# Patient Record
Sex: MALE | Race: WHITE | NOT HISPANIC OR LATINO | Employment: PART TIME | URBAN - METROPOLITAN AREA
[De-identification: names, ages, dates, MRNs, and addresses within clinical notes are randomized per-mention and may not be internally consistent; named-entity substitution may affect disease eponyms.]

---

## 2023-10-26 PROBLEM — I47.29 NSVT (NONSUSTAINED VENTRICULAR TACHYCARDIA) (MULTI): Status: ACTIVE | Noted: 2023-10-26

## 2023-10-26 PROBLEM — N52.9 MALE ERECTILE DISORDER: Status: ACTIVE | Noted: 2023-10-26

## 2023-10-26 PROBLEM — R06.02 SHORTNESS OF BREATH: Status: ACTIVE | Noted: 2023-10-26

## 2023-10-26 PROBLEM — R00.0 TACHYCARDIA: Status: ACTIVE | Noted: 2023-10-26

## 2023-10-26 PROBLEM — E78.5 HYPERLIPIDEMIA: Status: ACTIVE | Noted: 2023-10-26

## 2023-10-26 PROBLEM — E83.52 HYPERCALCEMIA: Status: ACTIVE | Noted: 2023-10-26

## 2023-10-26 PROBLEM — R60.0 LEG EDEMA, LEFT: Status: ACTIVE | Noted: 2023-10-26

## 2023-10-26 PROBLEM — K64.8 BLEEDING INTERNAL HEMORRHOIDS: Status: ACTIVE | Noted: 2023-10-26

## 2023-10-26 PROBLEM — J45.909 ASTHMA (HHS-HCC): Status: ACTIVE | Noted: 2023-10-26

## 2023-10-26 PROBLEM — I49.9 ARRHYTHMIA: Status: ACTIVE | Noted: 2023-10-26

## 2023-10-26 PROBLEM — I49.8 VENTRICULAR TRIGEMINY: Status: ACTIVE | Noted: 2023-10-26

## 2023-10-26 PROBLEM — K58.9 IRRITABLE BOWEL SYNDROME: Status: ACTIVE | Noted: 2023-10-26

## 2023-10-26 PROBLEM — I10 HTN (HYPERTENSION), BENIGN: Status: ACTIVE | Noted: 2023-10-26

## 2023-10-26 PROBLEM — K21.9 GASTROESOPHAGEAL REFLUX DISEASE: Status: ACTIVE | Noted: 2023-10-26

## 2023-10-26 PROBLEM — M19.90 OSTEOARTHRITIS: Status: ACTIVE | Noted: 2023-10-26

## 2023-10-26 RX ORDER — TORSEMIDE 20 MG/1
TABLET ORAL
COMMUNITY
Start: 2023-02-28

## 2023-10-26 RX ORDER — ATORVASTATIN CALCIUM 40 MG/1
1 TABLET, FILM COATED ORAL DAILY
COMMUNITY
Start: 2014-04-15

## 2023-10-26 RX ORDER — ASPIRIN 81 MG/1
1 TABLET ORAL DAILY
COMMUNITY

## 2023-10-26 RX ORDER — METOPROLOL TARTRATE 50 MG/1
TABLET ORAL 2 TIMES DAILY
COMMUNITY
Start: 2018-11-16

## 2023-10-26 RX ORDER — LATANOPROST 50 UG/ML
SOLUTION/ DROPS OPHTHALMIC
COMMUNITY
Start: 2023-03-21

## 2023-10-26 RX ORDER — HYDROXYZINE PAMOATE 50 MG/1
CAPSULE ORAL
COMMUNITY
Start: 2022-12-28

## 2023-10-26 RX ORDER — FLUTICASONE PROPIONATE 50 MCG
SPRAY, SUSPENSION (ML) NASAL
COMMUNITY
Start: 2015-10-01

## 2023-10-26 RX ORDER — PANTOPRAZOLE SODIUM 40 MG/1
TABLET, DELAYED RELEASE ORAL 2 TIMES DAILY
COMMUNITY
Start: 2014-06-01

## 2023-10-26 RX ORDER — POTASSIUM CHLORIDE 1500 MG/1
1 TABLET, EXTENDED RELEASE ORAL DAILY
COMMUNITY

## 2023-10-26 RX ORDER — DORZOLAMIDE HYDROCHLORIDE AND TIMOLOL MALEATE 20; 5 MG/ML; MG/ML
1 SOLUTION/ DROPS OPHTHALMIC 2 TIMES DAILY
COMMUNITY
Start: 2023-08-17

## 2023-10-26 RX ORDER — LORATADINE 10 MG/1
1 TABLET ORAL DAILY PRN
COMMUNITY

## 2023-10-27 ENCOUNTER — OFFICE VISIT (OUTPATIENT)
Dept: CARDIOLOGY | Facility: CLINIC | Age: 68
End: 2023-10-27
Payer: MEDICARE

## 2023-10-27 VITALS
OXYGEN SATURATION: 92 % | HEIGHT: 69 IN | HEART RATE: 70 BPM | SYSTOLIC BLOOD PRESSURE: 144 MMHG | BODY MASS INDEX: 39.25 KG/M2 | WEIGHT: 265 LBS | DIASTOLIC BLOOD PRESSURE: 80 MMHG

## 2023-10-27 DIAGNOSIS — I10 HTN (HYPERTENSION), BENIGN: ICD-10-CM

## 2023-10-27 DIAGNOSIS — I47.29 NSVT (NONSUSTAINED VENTRICULAR TACHYCARDIA) (MULTI): Primary | ICD-10-CM

## 2023-10-27 DIAGNOSIS — E78.49 OTHER HYPERLIPIDEMIA: ICD-10-CM

## 2023-10-27 PROCEDURE — 99214 OFFICE O/P EST MOD 30 MIN: CPT | Performed by: INTERNAL MEDICINE

## 2023-10-27 PROCEDURE — 1159F MED LIST DOCD IN RCRD: CPT | Performed by: INTERNAL MEDICINE

## 2023-10-27 PROCEDURE — 3008F BODY MASS INDEX DOCD: CPT | Performed by: INTERNAL MEDICINE

## 2023-10-27 PROCEDURE — 3079F DIAST BP 80-89 MM HG: CPT | Performed by: INTERNAL MEDICINE

## 2023-10-27 PROCEDURE — 3077F SYST BP >= 140 MM HG: CPT | Performed by: INTERNAL MEDICINE

## 2023-10-27 RX ORDER — CYCLOBENZAPRINE HCL 10 MG
10 TABLET ORAL EVERY 8 HOURS PRN
COMMUNITY
Start: 2023-09-08

## 2023-10-27 RX ORDER — ALBUTEROL SULFATE 0.83 MG/ML
2.5 SOLUTION RESPIRATORY (INHALATION) EVERY 4 HOURS PRN
COMMUNITY
Start: 2022-12-15

## 2023-10-27 RX ORDER — ALBUTEROL SULFATE 90 UG/1
2 AEROSOL, METERED RESPIRATORY (INHALATION) EVERY 6 HOURS PRN
COMMUNITY
Start: 2022-12-04

## 2023-10-27 NOTE — LETTER
October 27, 2023       No Recipients    Patient: Jules Madrigal   YOB: 1955   Date of Visit: 10/27/2023       Dear Dr. Casey Recipients:    Thank you for referring Jules Madrigal to me for evaluation. Below are my notes for this consultation.  If you have questions, please do not hesitate to call me. I look forward to following your patient along with you.       Sincerely,     Goldy Wallace MD      CC:   No Recipients  ______________________________________________________________________________________        [facility] Cardiology Outpatient Follow-up Visit     Reason for Visit: NSVT    HPI: Jules Madrigal is a 68 y.o.  male who presents today for followup.     The patient is a 68-year-old male with a history of hypertension, hyperlipidemia, morbid obesity, osteoarthritis who initially presented with a syncopal episode in November 2018. He had been taking Adipex and Topamax to lose weight. He woke up feeling nauseous. Despite this he went to work. He felt lightheaded at work and eventually had a syncopal episode. Per report, there were some ventricular tachycardia although we do not have documentation of this. Patient was in trigeminy when he came in to the emergency department. His weight loss medications were stopped and he was placed on beta-blockade. He was discharged with the recorder. His ejection fraction was 50-55%. He states he feels good without chest discomfort, shortness of breath, palpitations, lightheadedness, syncope, orthopnea, PND. He has left greater than right lower extremity edema since a total knee replacement in September 2020.     Echocardiogram demonstrates an ejection fraction of 50-55%. Holter monitoring demonstrates 26,690 PVCs with runs of nonsustained ventricular tachycardia, longest episode being 12 beats in duration. This comprises 11.1% of his total monitored heartbeats. Repeat Holter monitoring demonstrated rare PVCs. MPI August 2020: Normal  perfusion, ejection fraction 61%. 12/8/2022 echo: Ejection fraction 55 to 60%, pseudo normal filling.     Past Medical History:   He has a past medical history of Allergy status to unspecified drugs, medicaments and biological substances, Cough, unspecified (12/19/2014), Hemorrhage of anus and rectum, Impacted cerumen, right ear, Other chest pain, Other enthesopathies, not elsewhere classified, Other specified soft tissue disorders, Pain in left knee, Pain in unspecified toe(s), Paresthesia of skin, Personal history of (healed) traumatic fracture, Personal history of other diseases of the digestive system, Personal history of other diseases of the digestive system, Personal history of other endocrine, nutritional and metabolic disease, Personal history of other medical treatment, Personal history of other medical treatment, Personal history of other specified conditions, Strain of unspecified achilles tendon, initial encounter, Syncope and collapse (12/07/2018), Ventral hernia without obstruction or gangrene, and Ventricular premature depolarization (12/11/2018).    Surgical History:   He has a past surgical history that includes Hernia repair (09/18/2014); Appendectomy (09/18/2014); and Other surgical history (02/18/2021).    Family History:   No family history on file.        Allergies:  Nsaids (non-steroidal anti-inflammatory drug), Penicillins, and Erythromycin     Social History:   Former smoker, social alcohol, no drugs.    Prior Cardiovascular Testing (Personally Reviewed):     Review of Systems:  Review of Systems   All other systems reviewed and are negative.      Outpatient Medications:    Current Outpatient Medications:   •  albuterol 2.5 mg /3 mL (0.083 %) nebulizer solution, Take 3 mL (2.5 mg) by nebulization every 4 hours if needed., Disp: , Rfl:   •  albuterol 90 mcg/actuation inhaler, Inhale 2 puffs every 6 hours if needed., Disp: , Rfl:   •  cyclobenzaprine (Flexeril) 10 mg tablet, Take 1 tablet (10  "mg) by mouth every 8 hours if needed., Disp: , Rfl:   •  aspirin 81 mg EC tablet, Take 1 tablet (81 mg) by mouth once daily., Disp: , Rfl:   •  atorvastatin (Lipitor) 40 mg tablet, Take 1 tablet (40 mg) by mouth once daily., Disp: , Rfl:   •  dorzolamide-timoloL (Cosopt) 22.3-6.8 mg/mL ophthalmic solution, Administer 1 drop into affected eye(s) twice a day., Disp: , Rfl:   •  fluticasone (Flonase) 50 mcg/actuation nasal spray, Administer into affected nostril(s)., Disp: , Rfl:   •  hydrocortisone acetate 2.5 % cream with perineal applicator, INSERT 1 APPLICATORFUL RECTALLY 3 TIMES DAILY., Disp: , Rfl:   •  hydrOXYzine pamoate (Vistaril) 50 mg capsule, , Disp: , Rfl:   •  Klor-Con M20 20 mEq ER tablet, Take 1 tablet (20 mEq) by mouth once daily., Disp: , Rfl:   •  latanoprost (Xalatan) 0.005 % ophthalmic solution, INSTILL ONE DROP INTO THE RIGHT EYE EVERY EVENING, Disp: , Rfl:   •  loratadine (Claritin) 10 mg tablet, Take 1 tablet (10 mg) by mouth once daily as needed., Disp: , Rfl:   •  metoprolol tartrate (Lopressor) 50 mg tablet, Take by mouth twice a day., Disp: , Rfl:   •  pantoprazole (ProtoNix) 40 mg EC tablet, Take by mouth twice a day., Disp: , Rfl:   •  torsemide (Demadex) 20 mg tablet, , Disp: , Rfl:      Last Recorded Vitals  /80 (BP Location: Left arm, Patient Position: Sitting, BP Cuff Size: Large adult)   Pulse 70   Ht 1.753 m (5' 9\")   Wt 120 kg (265 lb)   SpO2 92%   BMI 39.13 kg/m²     Physical Exam:    Physical Exam  Vitals reviewed.   Constitutional:       Appearance: Normal appearance.   HENT:      Head: Normocephalic and atraumatic.      Mouth/Throat:      Mouth: Mucous membranes are moist.      Pharynx: Oropharynx is clear.   Cardiovascular:      Rate and Rhythm: Normal rate and regular rhythm.      Pulses: Normal pulses.      Heart sounds: Normal heart sounds.   Pulmonary:      Effort: Pulmonary effort is normal.      Breath sounds: Normal breath sounds.   Abdominal:      General: " "Bowel sounds are normal.      Palpations: Abdomen is soft.   Musculoskeletal:      Cervical back: Neck supple.      Right lower le+ Edema present.      Left lower le+ Edema present.   Skin:     General: Skin is warm and dry.   Neurological:      General: No focal deficit present.      Mental Status: He is alert.   Psychiatric:         Mood and Affect: Mood normal.         Behavior: Behavior normal.         Lab/Radiology/Diagnostic Review:    Labs    Lab Results   Component Value Date    GLUCOSE 81 2023    CALCIUM 9.5 2023     2023    K 4.0 2023    CO2 32 2023     2023    BUN 16 2023    CREATININE 0.96 2023       Lab Results   Component Value Date    WBC 6.4 2022    HGB 14.2 2022    HCT 44.9 2022    MCV 97 2022     2022       No results found for: \"CHOL\"  No results found for: \"HDL\"  No results found for: \"LDLCALC\"  No results found for: \"TRIG\"  No components found for: \"CHOLHDL\"    Lab Results   Component Value Date    BNP 14 2022    BNP 26 2018       Lab Results   Component Value Date    TSH 2.63 2018       Assessment:   1.  NSVT.  2.  Hypertension  3.  Hyperlipidemia  4.  Lower extremity edema.    Overall Plan:  Patient is asymptomatic from a cardiac standpoint. He is on aspirin, atorvastatin and beta-blockade.     Patient's Holter monitor demonstrated significant ectopy (11.1% of his total heartbeats). This was off weight loss medications and on low-dose metoprolol. With increasing beta-blockade, a repeat Holter monitor showed rare, isolated PVCs with no ventricular tachycardia. His ejection fraction is low normal at 50-55%.      Would continue his current medical therapy. Myocardial perfusion study in 2020 was negative for ischemia with an ejection fraction 61%. His echocardiogram in 2022 demonstrated normal LV function     Continue torsemide.     The patient will follow-up " with me in 12 months or sooner if he has more problems.    Disposition-     Thank you for your visit today. Please contact our office with any questions.     Goldy Wallace MD

## 2023-10-27 NOTE — PROGRESS NOTES
[facility] Cardiology Outpatient Follow-up Visit     Reason for Visit: NSVT    HPI: Jules Madrigal is a 68 y.o.  male who presents today for followup.     The patient is a 68-year-old male with a history of hypertension, hyperlipidemia, morbid obesity, osteoarthritis who initially presented with a syncopal episode in November 2018. He had been taking Adipex and Topamax to lose weight. He woke up feeling nauseous. Despite this he went to work. He felt lightheaded at work and eventually had a syncopal episode. Per report, there were some ventricular tachycardia although we do not have documentation of this. Patient was in trigeminy when he came in to the emergency department. His weight loss medications were stopped and he was placed on beta-blockade. He was discharged with the recorder. His ejection fraction was 50-55%. He states he feels good without chest discomfort, shortness of breath, palpitations, lightheadedness, syncope, orthopnea, PND. He has left greater than right lower extremity edema since a total knee replacement in September 2020.     Echocardiogram demonstrates an ejection fraction of 50-55%. Holter monitoring demonstrates 26,690 PVCs with runs of nonsustained ventricular tachycardia, longest episode being 12 beats in duration. This comprises 11.1% of his total monitored heartbeats. Repeat Holter monitoring demonstrated rare PVCs. MPI August 2020: Normal perfusion, ejection fraction 61%. 12/8/2022 echo: Ejection fraction 55 to 60%, pseudo normal filling.     Past Medical History:   He has a past medical history of Allergy status to unspecified drugs, medicaments and biological substances, Cough, unspecified (12/19/2014), Hemorrhage of anus and rectum, Impacted cerumen, right ear, Other chest pain, Other enthesopathies, not elsewhere classified, Other specified soft tissue disorders, Pain in left knee, Pain in unspecified toe(s), Paresthesia of skin, Personal history of (healed) traumatic  fracture, Personal history of other diseases of the digestive system, Personal history of other diseases of the digestive system, Personal history of other endocrine, nutritional and metabolic disease, Personal history of other medical treatment, Personal history of other medical treatment, Personal history of other specified conditions, Strain of unspecified achilles tendon, initial encounter, Syncope and collapse (12/07/2018), Ventral hernia without obstruction or gangrene, and Ventricular premature depolarization (12/11/2018).    Surgical History:   He has a past surgical history that includes Hernia repair (09/18/2014); Appendectomy (09/18/2014); and Other surgical history (02/18/2021).    Family History:   No family history on file.        Allergies:  Nsaids (non-steroidal anti-inflammatory drug), Penicillins, and Erythromycin     Social History:   Former smoker, social alcohol, no drugs.    Prior Cardiovascular Testing (Personally Reviewed):     Review of Systems:  Review of Systems   All other systems reviewed and are negative.      Outpatient Medications:    Current Outpatient Medications:     albuterol 2.5 mg /3 mL (0.083 %) nebulizer solution, Take 3 mL (2.5 mg) by nebulization every 4 hours if needed., Disp: , Rfl:     albuterol 90 mcg/actuation inhaler, Inhale 2 puffs every 6 hours if needed., Disp: , Rfl:     cyclobenzaprine (Flexeril) 10 mg tablet, Take 1 tablet (10 mg) by mouth every 8 hours if needed., Disp: , Rfl:     aspirin 81 mg EC tablet, Take 1 tablet (81 mg) by mouth once daily., Disp: , Rfl:     atorvastatin (Lipitor) 40 mg tablet, Take 1 tablet (40 mg) by mouth once daily., Disp: , Rfl:     dorzolamide-timoloL (Cosopt) 22.3-6.8 mg/mL ophthalmic solution, Administer 1 drop into affected eye(s) twice a day., Disp: , Rfl:     fluticasone (Flonase) 50 mcg/actuation nasal spray, Administer into affected nostril(s)., Disp: , Rfl:     hydrocortisone acetate 2.5 % cream with perineal applicator,  "INSERT 1 APPLICATORFUL RECTALLY 3 TIMES DAILY., Disp: , Rfl:     hydrOXYzine pamoate (Vistaril) 50 mg capsule, , Disp: , Rfl:     Klor-Con M20 20 mEq ER tablet, Take 1 tablet (20 mEq) by mouth once daily., Disp: , Rfl:     latanoprost (Xalatan) 0.005 % ophthalmic solution, INSTILL ONE DROP INTO THE RIGHT EYE EVERY EVENING, Disp: , Rfl:     loratadine (Claritin) 10 mg tablet, Take 1 tablet (10 mg) by mouth once daily as needed., Disp: , Rfl:     metoprolol tartrate (Lopressor) 50 mg tablet, Take by mouth twice a day., Disp: , Rfl:     pantoprazole (ProtoNix) 40 mg EC tablet, Take by mouth twice a day., Disp: , Rfl:     torsemide (Demadex) 20 mg tablet, , Disp: , Rfl:      Last Recorded Vitals  /80 (BP Location: Left arm, Patient Position: Sitting, BP Cuff Size: Large adult)   Pulse 70   Ht 1.753 m (5' 9\")   Wt 120 kg (265 lb)   SpO2 92%   BMI 39.13 kg/m²     Physical Exam:    Physical Exam  Vitals reviewed.   Constitutional:       Appearance: Normal appearance.   HENT:      Head: Normocephalic and atraumatic.      Mouth/Throat:      Mouth: Mucous membranes are moist.      Pharynx: Oropharynx is clear.   Cardiovascular:      Rate and Rhythm: Normal rate and regular rhythm.      Pulses: Normal pulses.      Heart sounds: Normal heart sounds.   Pulmonary:      Effort: Pulmonary effort is normal.      Breath sounds: Normal breath sounds.   Abdominal:      General: Bowel sounds are normal.      Palpations: Abdomen is soft.   Musculoskeletal:      Cervical back: Neck supple.      Right lower le+ Edema present.      Left lower le+ Edema present.   Skin:     General: Skin is warm and dry.   Neurological:      General: No focal deficit present.      Mental Status: He is alert.   Psychiatric:         Mood and Affect: Mood normal.         Behavior: Behavior normal.         Lab/Radiology/Diagnostic Review:    Labs    Lab Results   Component Value Date    GLUCOSE 81 2023    CALCIUM 9.5 2023     " "02/09/2023    K 4.0 02/09/2023    CO2 32 02/09/2023     02/09/2023    BUN 16 02/09/2023    CREATININE 0.96 02/09/2023       Lab Results   Component Value Date    WBC 6.4 12/09/2022    HGB 14.2 12/09/2022    HCT 44.9 12/09/2022    MCV 97 12/09/2022     12/09/2022       No results found for: \"CHOL\"  No results found for: \"HDL\"  No results found for: \"LDLCALC\"  No results found for: \"TRIG\"  No components found for: \"CHOLHDL\"    Lab Results   Component Value Date    BNP 14 12/04/2022    BNP 26 11/14/2018       Lab Results   Component Value Date    TSH 2.63 11/14/2018       Assessment:   1.  NSVT.  2.  Hypertension  3.  Hyperlipidemia  4.  Lower extremity edema.    Overall Plan:  Patient is asymptomatic from a cardiac standpoint. He is on aspirin, atorvastatin and beta-blockade.     Patient's Holter monitor demonstrated significant ectopy (11.1% of his total heartbeats). This was off weight loss medications and on low-dose metoprolol. With increasing beta-blockade, a repeat Holter monitor showed rare, isolated PVCs with no ventricular tachycardia. His ejection fraction is low normal at 50-55%.      Would continue his current medical therapy. Myocardial perfusion study in August 2020 was negative for ischemia with an ejection fraction 61%. His echocardiogram in December 2022 demonstrated normal LV function     Continue torsemide.     The patient will follow-up with me in 12 months or sooner if he has more problems.    Disposition-     Thank you for your visit today. Please contact our office with any questions.     Goldy Wallace MD      "

## 2023-10-27 NOTE — LETTER
October 27, 2023     Isidro Stevenson MD  [address] [facility] [address] [facility] [address]  [place] [address]    Patient: Jules Madrigal   YOB: 1955   Date of Visit: 10/27/2023       Dear Dr. Isidro Stevenson MD:    Thank you for referring Jules Madrigal to me for evaluation. Below are my notes for this consultation.  If you have questions, please do not hesitate to call me. I look forward to following your patient along with you.       Sincerely,     Goldy Wallace MD      CC: No Recipients  ______________________________________________________________________________________        [facility] Cardiology Outpatient Follow-up Visit     Reason for Visit: NSVT    HPI: Jules Madrigal is a 68 y.o.  male who presents today for followup.     The patient is a 68-year-old male with a history of hypertension, hyperlipidemia, morbid obesity, osteoarthritis who initially presented with a syncopal episode in November 2018. He had been taking Adipex and Topamax to lose weight. He woke up feeling nauseous. Despite this he went to work. He felt lightheaded at work and eventually had a syncopal episode. Per report, there were some ventricular tachycardia although we do not have documentation of this. Patient was in trigeminy when he came in to the emergency department. His weight loss medications were stopped and he was placed on beta-blockade. He was discharged with the recorder. His ejection fraction was 50-55%. He states he feels good without chest discomfort, shortness of breath, palpitations, lightheadedness, syncope, orthopnea, PND. He has left greater than right lower extremity edema since a total knee replacement in September 2020.     Echocardiogram demonstrates an ejection fraction of 50-55%. Holter monitoring demonstrates 26,690 PVCs with runs of nonsustained ventricular tachycardia, longest episode being 12 beats in duration. This comprises 11.1% of his total monitored heartbeats. Repeat Holter monitoring  demonstrated rare PVCs. MPI August 2020: Normal perfusion, ejection fraction 61%. 12/8/2022 echo: Ejection fraction 55 to 60%, pseudo normal filling.     Past Medical History:   He has a past medical history of Allergy status to unspecified drugs, medicaments and biological substances, Cough, unspecified (12/19/2014), Hemorrhage of anus and rectum, Impacted cerumen, right ear, Other chest pain, Other enthesopathies, not elsewhere classified, Other specified soft tissue disorders, Pain in left knee, Pain in unspecified toe(s), Paresthesia of skin, Personal history of (healed) traumatic fracture, Personal history of other diseases of the digestive system, Personal history of other diseases of the digestive system, Personal history of other endocrine, nutritional and metabolic disease, Personal history of other medical treatment, Personal history of other medical treatment, Personal history of other specified conditions, Strain of unspecified achilles tendon, initial encounter, Syncope and collapse (12/07/2018), Ventral hernia without obstruction or gangrene, and Ventricular premature depolarization (12/11/2018).    Surgical History:   He has a past surgical history that includes Hernia repair (09/18/2014); Appendectomy (09/18/2014); and Other surgical history (02/18/2021).    Family History:   No family history on file.        Allergies:  Nsaids (non-steroidal anti-inflammatory drug), Penicillins, and Erythromycin     Social History:   Former smoker, social alcohol, no drugs.    Prior Cardiovascular Testing (Personally Reviewed):     Review of Systems:  Review of Systems   All other systems reviewed and are negative.      Outpatient Medications:    Current Outpatient Medications:   •  albuterol 2.5 mg /3 mL (0.083 %) nebulizer solution, Take 3 mL (2.5 mg) by nebulization every 4 hours if needed., Disp: , Rfl:   •  albuterol 90 mcg/actuation inhaler, Inhale 2 puffs every 6 hours if needed., Disp: , Rfl:   •   "cyclobenzaprine (Flexeril) 10 mg tablet, Take 1 tablet (10 mg) by mouth every 8 hours if needed., Disp: , Rfl:   •  aspirin 81 mg EC tablet, Take 1 tablet (81 mg) by mouth once daily., Disp: , Rfl:   •  atorvastatin (Lipitor) 40 mg tablet, Take 1 tablet (40 mg) by mouth once daily., Disp: , Rfl:   •  dorzolamide-timoloL (Cosopt) 22.3-6.8 mg/mL ophthalmic solution, Administer 1 drop into affected eye(s) twice a day., Disp: , Rfl:   •  fluticasone (Flonase) 50 mcg/actuation nasal spray, Administer into affected nostril(s)., Disp: , Rfl:   •  hydrocortisone acetate 2.5 % cream with perineal applicator, INSERT 1 APPLICATORFUL RECTALLY 3 TIMES DAILY., Disp: , Rfl:   •  hydrOXYzine pamoate (Vistaril) 50 mg capsule, , Disp: , Rfl:   •  Klor-Con M20 20 mEq ER tablet, Take 1 tablet (20 mEq) by mouth once daily., Disp: , Rfl:   •  latanoprost (Xalatan) 0.005 % ophthalmic solution, INSTILL ONE DROP INTO THE RIGHT EYE EVERY EVENING, Disp: , Rfl:   •  loratadine (Claritin) 10 mg tablet, Take 1 tablet (10 mg) by mouth once daily as needed., Disp: , Rfl:   •  metoprolol tartrate (Lopressor) 50 mg tablet, Take by mouth twice a day., Disp: , Rfl:   •  pantoprazole (ProtoNix) 40 mg EC tablet, Take by mouth twice a day., Disp: , Rfl:   •  torsemide (Demadex) 20 mg tablet, , Disp: , Rfl:      Last Recorded Vitals  /80 (BP Location: Left arm, Patient Position: Sitting, BP Cuff Size: Large adult)   Pulse 70   Ht 1.753 m (5' 9\")   Wt 120 kg (265 lb)   SpO2 92%   BMI 39.13 kg/m²     Physical Exam:    Physical Exam  Vitals reviewed.   Constitutional:       Appearance: Normal appearance.   HENT:      Head: Normocephalic and atraumatic.      Mouth/Throat:      Mouth: Mucous membranes are moist.      Pharynx: Oropharynx is clear.   Cardiovascular:      Rate and Rhythm: Normal rate and regular rhythm.      Pulses: Normal pulses.      Heart sounds: Normal heart sounds.   Pulmonary:      Effort: Pulmonary effort is normal.      Breath " "sounds: Normal breath sounds.   Abdominal:      General: Bowel sounds are normal.      Palpations: Abdomen is soft.   Musculoskeletal:      Cervical back: Neck supple.      Right lower le+ Edema present.      Left lower le+ Edema present.   Skin:     General: Skin is warm and dry.   Neurological:      General: No focal deficit present.      Mental Status: He is alert.   Psychiatric:         Mood and Affect: Mood normal.         Behavior: Behavior normal.         Lab/Radiology/Diagnostic Review:    Labs    Lab Results   Component Value Date    GLUCOSE 81 2023    CALCIUM 9.5 2023     2023    K 4.0 2023    CO2 32 2023     2023    BUN 16 2023    CREATININE 0.96 2023       Lab Results   Component Value Date    WBC 6.4 2022    HGB 14.2 2022    HCT 44.9 2022    MCV 97 2022     2022       No results found for: \"CHOL\"  No results found for: \"HDL\"  No results found for: \"LDLCALC\"  No results found for: \"TRIG\"  No components found for: \"CHOLHDL\"    Lab Results   Component Value Date    BNP 14 2022    BNP 26 2018       Lab Results   Component Value Date    TSH 2.63 2018       Assessment:   1.  NSVT.  2.  Hypertension  3.  Hyperlipidemia  4.  Lower extremity edema.    Overall Plan:  Patient is asymptomatic from a cardiac standpoint. He is on aspirin, atorvastatin and beta-blockade.     Patient's Holter monitor demonstrated significant ectopy (11.1% of his total heartbeats). This was off weight loss medications and on low-dose metoprolol. With increasing beta-blockade, a repeat Holter monitor showed rare, isolated PVCs with no ventricular tachycardia. His ejection fraction is low normal at 50-55%.      Would continue his current medical therapy. Myocardial perfusion study in 2020 was negative for ischemia with an ejection fraction 61%. His echocardiogram in 2022 demonstrated normal LV " function     Continue torsemide.     The patient will follow-up with me in 12 months or sooner if he has more problems.    Disposition-     Thank you for your visit today. Please contact our office with any questions.     Goldy Wallace MD

## 2024-04-07 ENCOUNTER — APPOINTMENT (OUTPATIENT)
Dept: RADIOLOGY | Facility: HOSPITAL | Age: 69
End: 2024-04-07
Payer: MEDICARE

## 2024-04-07 ENCOUNTER — HOSPITAL ENCOUNTER (EMERGENCY)
Facility: HOSPITAL | Age: 69
Discharge: HOME | End: 2024-04-07
Attending: EMERGENCY MEDICINE
Payer: MEDICARE

## 2024-04-07 ENCOUNTER — APPOINTMENT (OUTPATIENT)
Dept: CARDIOLOGY | Facility: HOSPITAL | Age: 69
End: 2024-04-07
Payer: MEDICARE

## 2024-04-07 VITALS
WEIGHT: 265 LBS | RESPIRATION RATE: 21 BRPM | DIASTOLIC BLOOD PRESSURE: 77 MMHG | TEMPERATURE: 97.2 F | HEART RATE: 72 BPM | SYSTOLIC BLOOD PRESSURE: 187 MMHG | OXYGEN SATURATION: 95 % | BODY MASS INDEX: 39.25 KG/M2 | HEIGHT: 69 IN

## 2024-04-07 DIAGNOSIS — R11.0 NAUSEA: ICD-10-CM

## 2024-04-07 DIAGNOSIS — R10.84 GENERALIZED ABDOMINAL PAIN: Primary | ICD-10-CM

## 2024-04-07 LAB
ALBUMIN SERPL BCP-MCNC: 4 G/DL (ref 3.4–5)
ALP SERPL-CCNC: 49 U/L (ref 33–136)
ALT SERPL W P-5'-P-CCNC: 36 U/L (ref 10–52)
ANION GAP SERPL CALC-SCNC: 11 MMOL/L (ref 10–20)
AST SERPL W P-5'-P-CCNC: 23 U/L (ref 9–39)
BASOPHILS # BLD AUTO: 0.02 X10*3/UL (ref 0–0.1)
BASOPHILS NFR BLD AUTO: 0.3 %
BILIRUB SERPL-MCNC: 0.8 MG/DL (ref 0–1.2)
BUN SERPL-MCNC: 16 MG/DL (ref 6–23)
CALCIUM SERPL-MCNC: 9.3 MG/DL (ref 8.6–10.3)
CARDIAC TROPONIN I PNL SERPL HS: 4 NG/L (ref 0–20)
CHLORIDE SERPL-SCNC: 101 MMOL/L (ref 98–107)
CO2 SERPL-SCNC: 32 MMOL/L (ref 21–32)
CREAT SERPL-MCNC: 0.82 MG/DL (ref 0.5–1.3)
EGFRCR SERPLBLD CKD-EPI 2021: >90 ML/MIN/1.73M*2
EOSINOPHIL # BLD AUTO: 0.07 X10*3/UL (ref 0–0.7)
EOSINOPHIL NFR BLD AUTO: 0.9 %
ERYTHROCYTE [DISTWIDTH] IN BLOOD BY AUTOMATED COUNT: 14.1 % (ref 11.5–14.5)
GLUCOSE SERPL-MCNC: 122 MG/DL (ref 74–99)
HCT VFR BLD AUTO: 46.3 % (ref 41–52)
HGB BLD-MCNC: 15.5 G/DL (ref 13.5–17.5)
IMM GRANULOCYTES # BLD AUTO: 0.02 X10*3/UL (ref 0–0.7)
IMM GRANULOCYTES NFR BLD AUTO: 0.3 % (ref 0–0.9)
LACTATE SERPL-SCNC: 1.2 MMOL/L (ref 0.4–2)
LIPASE SERPL-CCNC: 28 U/L (ref 9–82)
LYMPHOCYTES # BLD AUTO: 1.57 X10*3/UL (ref 1.2–4.8)
LYMPHOCYTES NFR BLD AUTO: 20.8 %
MCH RBC QN AUTO: 31.5 PG (ref 26–34)
MCHC RBC AUTO-ENTMCNC: 33.5 G/DL (ref 32–36)
MCV RBC AUTO: 94 FL (ref 80–100)
MONOCYTES # BLD AUTO: 0.73 X10*3/UL (ref 0.1–1)
MONOCYTES NFR BLD AUTO: 9.7 %
NEUTROPHILS # BLD AUTO: 5.15 X10*3/UL (ref 1.2–7.7)
NEUTROPHILS NFR BLD AUTO: 68 %
NRBC BLD-RTO: 0 /100 WBCS (ref 0–0)
PLATELET # BLD AUTO: 251 X10*3/UL (ref 150–450)
POTASSIUM SERPL-SCNC: 3.8 MMOL/L (ref 3.5–5.3)
PROT SERPL-MCNC: 7.3 G/DL (ref 6.4–8.2)
RBC # BLD AUTO: 4.92 X10*6/UL (ref 4.5–5.9)
SODIUM SERPL-SCNC: 140 MMOL/L (ref 136–145)
WBC # BLD AUTO: 7.6 X10*3/UL (ref 4.4–11.3)

## 2024-04-07 PROCEDURE — 96374 THER/PROPH/DIAG INJ IV PUSH: CPT

## 2024-04-07 PROCEDURE — 84484 ASSAY OF TROPONIN QUANT: CPT | Performed by: EMERGENCY MEDICINE

## 2024-04-07 PROCEDURE — 74177 CT ABD & PELVIS W/CONTRAST: CPT

## 2024-04-07 PROCEDURE — 74177 CT ABD & PELVIS W/CONTRAST: CPT | Mod: FOREIGN READ | Performed by: RADIOLOGY

## 2024-04-07 PROCEDURE — 85025 COMPLETE CBC W/AUTO DIFF WBC: CPT | Performed by: EMERGENCY MEDICINE

## 2024-04-07 PROCEDURE — 2500000004 HC RX 250 GENERAL PHARMACY W/ HCPCS (ALT 636 FOR OP/ED): Performed by: EMERGENCY MEDICINE

## 2024-04-07 PROCEDURE — 36415 COLL VENOUS BLD VENIPUNCTURE: CPT | Performed by: EMERGENCY MEDICINE

## 2024-04-07 PROCEDURE — 80053 COMPREHEN METABOLIC PANEL: CPT | Performed by: EMERGENCY MEDICINE

## 2024-04-07 PROCEDURE — 93005 ELECTROCARDIOGRAM TRACING: CPT

## 2024-04-07 PROCEDURE — 83690 ASSAY OF LIPASE: CPT | Performed by: EMERGENCY MEDICINE

## 2024-04-07 PROCEDURE — 83605 ASSAY OF LACTIC ACID: CPT | Performed by: EMERGENCY MEDICINE

## 2024-04-07 PROCEDURE — 2550000001 HC RX 255 CONTRASTS: Performed by: EMERGENCY MEDICINE

## 2024-04-07 PROCEDURE — 96361 HYDRATE IV INFUSION ADD-ON: CPT

## 2024-04-07 PROCEDURE — 99285 EMERGENCY DEPT VISIT HI MDM: CPT | Mod: 25

## 2024-04-07 RX ORDER — ONDANSETRON 4 MG/1
4 TABLET, ORALLY DISINTEGRATING ORAL EVERY 8 HOURS PRN
Qty: 20 TABLET | Refills: 0 | Status: SHIPPED | OUTPATIENT
Start: 2024-04-07 | End: 2024-04-14

## 2024-04-07 RX ORDER — ONDANSETRON HYDROCHLORIDE 2 MG/ML
4 INJECTION, SOLUTION INTRAVENOUS ONCE
Status: COMPLETED | OUTPATIENT
Start: 2024-04-07 | End: 2024-04-07

## 2024-04-07 RX ADMIN — ONDANSETRON 4 MG: 2 INJECTION INTRAMUSCULAR; INTRAVENOUS at 08:27

## 2024-04-07 RX ADMIN — IOHEXOL 75 ML: 350 INJECTION, SOLUTION INTRAVENOUS at 09:35

## 2024-04-07 RX ADMIN — SODIUM CHLORIDE 1000 ML: 9 INJECTION, SOLUTION INTRAVENOUS at 08:26

## 2024-04-07 ASSESSMENT — COLUMBIA-SUICIDE SEVERITY RATING SCALE - C-SSRS
2. HAVE YOU ACTUALLY HAD ANY THOUGHTS OF KILLING YOURSELF?: NO
1. IN THE PAST MONTH, HAVE YOU WISHED YOU WERE DEAD OR WISHED YOU COULD GO TO SLEEP AND NOT WAKE UP?: NO
6. HAVE YOU EVER DONE ANYTHING, STARTED TO DO ANYTHING, OR PREPARED TO DO ANYTHING TO END YOUR LIFE?: NO

## 2024-04-07 ASSESSMENT — PAIN DESCRIPTION - ORIENTATION: ORIENTATION: MID;UPPER

## 2024-04-07 ASSESSMENT — PAIN DESCRIPTION - DESCRIPTORS: DESCRIPTORS: ACHING

## 2024-04-07 ASSESSMENT — PAIN DESCRIPTION - FREQUENCY: FREQUENCY: INTERMITTENT

## 2024-04-07 ASSESSMENT — LIFESTYLE VARIABLES
HAVE YOU EVER FELT YOU SHOULD CUT DOWN ON YOUR DRINKING: NO
EVER HAD A DRINK FIRST THING IN THE MORNING TO STEADY YOUR NERVES TO GET RID OF A HANGOVER: NO
HAVE PEOPLE ANNOYED YOU BY CRITICIZING YOUR DRINKING: NO
EVER FELT BAD OR GUILTY ABOUT YOUR DRINKING: NO
TOTAL SCORE: 0

## 2024-04-07 ASSESSMENT — PAIN - FUNCTIONAL ASSESSMENT: PAIN_FUNCTIONAL_ASSESSMENT: 0-10

## 2024-04-07 ASSESSMENT — PAIN SCALES - GENERAL: PAINLEVEL_OUTOF10: 5 - MODERATE PAIN

## 2024-04-07 ASSESSMENT — PAIN DESCRIPTION - PROGRESSION: CLINICAL_PROGRESSION: GRADUALLY WORSENING

## 2024-04-07 ASSESSMENT — PAIN DESCRIPTION - LOCATION: LOCATION: ABDOMEN

## 2024-04-07 ASSESSMENT — PAIN DESCRIPTION - PAIN TYPE: TYPE: ACUTE PAIN

## 2024-04-07 NOTE — ED TRIAGE NOTES
Patient arrives from home with upper abdominal pain with bad indigestion and nausea that has been on going since Tuesday and has progressively gotten worse

## 2024-04-07 NOTE — ED PROVIDER NOTES
HPI   Chief Complaint   Patient presents with    Abdominal Pain     Patient arrives from home with upper abdominal pain with bad indigestion and nausea that has been on going since Tuesday and has progressively gotten worse.  No complaints of vomiting or diarrhea       68-year-old male who presents with abdominal pain.  Patient states he has been having mid abdominal pain that radiates to his right side for the past couple days.  Denies any nausea vomiting.  He denies any diarrhea.  Denies any fevers chills.  States right now he has no pain but is nauseated.  Denies any dysuria, frequency or urgency.  Patient has had a prior appendectomy and hernia repair.                          No data recorded                   Patient History   Past Medical History:   Diagnosis Date    Allergy status to unspecified drugs, medicaments and biological substances     History of seasonal allergies    Cough, unspecified 12/19/2014    Cough    Hemorrhage of anus and rectum     Rectal bleed    Impacted cerumen, right ear     Impacted cerumen of right ear    Other chest pain     Atypical chest pain    Other enthesopathies, not elsewhere classified     Tendinitis of thumb    Other specified soft tissue disorders     Leg swelling    Pain in left knee     Left knee pain    Pain in unspecified toe(s)     Toe pain    Paresthesia of skin     Paresthesia    Personal history of (healed) traumatic fracture     History of fractured kneecap    Personal history of other diseases of the digestive system     History of constipation    Personal history of other diseases of the digestive system     History of hemorrhoids    Personal history of other endocrine, nutritional and metabolic disease     History of obesity    Personal history of other medical treatment     History of nuclear stress test    Personal history of other medical treatment     History of echocardiogram    Personal history of other specified conditions     History of nausea    Strain  of unspecified achilles tendon, initial encounter     Torn Achilles tendon    Syncope and collapse 12/07/2018    Syncope, near    Ventral hernia without obstruction or gangrene     Epigastric hernia    Ventricular premature depolarization 12/11/2018    Symptomatic PVCs     Past Surgical History:   Procedure Laterality Date    APPENDECTOMY  09/18/2014    Appendectomy    HERNIA REPAIR  09/18/2014    Hernia Repair    OTHER SURGICAL HISTORY  02/18/2021    Knee surgery     No family history on file.  Social History     Tobacco Use    Smoking status: Never    Smokeless tobacco: Never   Substance Use Topics    Alcohol use: Yes     Comment: occasional    Drug use: Never       Physical Exam   ED Triage Vitals [04/07/24 0741]   Temperature Heart Rate Respirations BP   36.2 °C (97.2 °F) 84 20 (!) 187/77      Pulse Ox Temp Source Heart Rate Source Patient Position   95 % Temporal Monitor Sitting      BP Location FiO2 (%)     Right arm --       Physical Exam  Constitutional:       Appearance: Normal appearance. He is normal weight.   HENT:      Head: Normocephalic and atraumatic.      Nose: Nose normal.      Mouth/Throat:      Mouth: Mucous membranes are moist.      Pharynx: Oropharynx is clear.   Eyes:      Extraocular Movements: Extraocular movements intact.      Conjunctiva/sclera: Conjunctivae normal.      Pupils: Pupils are equal, round, and reactive to light.   Cardiovascular:      Rate and Rhythm: Normal rate and regular rhythm.   Pulmonary:      Effort: Pulmonary effort is normal.      Breath sounds: Normal breath sounds.   Abdominal:      General: Abdomen is flat. Bowel sounds are normal.      Palpations: Abdomen is soft.   Musculoskeletal:         General: Normal range of motion.      Cervical back: Normal range of motion and neck supple.   Skin:     General: Skin is warm and dry.      Capillary Refill: Capillary refill takes less than 2 seconds.   Neurological:      General: No focal deficit present.      Mental  Status: He is alert.   Psychiatric:         Mood and Affect: Mood normal.         Behavior: Behavior normal.         Thought Content: Thought content normal.         Judgment: Judgment normal.       Labs Reviewed   COMPREHENSIVE METABOLIC PANEL - Abnormal       Result Value    Glucose 122 (*)     Sodium 140      Potassium 3.8      Chloride 101      Bicarbonate 32      Anion Gap 11      Urea Nitrogen 16      Creatinine 0.82      eGFR >90      Calcium 9.3      Albumin 4.0      Alkaline Phosphatase 49      Total Protein 7.3      AST 23      Bilirubin, Total 0.8      ALT 36     LIPASE - Normal    Lipase 28      Narrative:     Venipuncture immediately after or during the administration of Metamizole may lead to falsely low results. Testing should be performed immediately prior to Metamizole dosing.   LACTATE - Normal    Lactate 1.2      Narrative:     Venipuncture immediately after or during the administration of Metamizole may lead to falsely low results. Testing should be performed immediately  prior to Metamizole dosing.   TROPONIN I, HIGH SENSITIVITY - Normal    Troponin I, High Sensitivity 4      Narrative:     Less than 99th percentile of normal range cutoff-  Female and children under 18 years old <14 ng/L; Male <21 ng/L: Negative  Repeat testing should be performed if clinically indicated.     Female and children under 18 years old 14-50 ng/L; Male 21-50 ng/L:  Consistent with possible cardiac damage and possible increased clinical   risk. Serial measurements may help to assess extent of myocardial damage.     >50 ng/L: Consistent with cardiac damage, increased clinical risk and  myocardial infarction. Serial measurements may help assess extent of   myocardial damage.      NOTE: Children less than 1 year old may have higher baseline troponin   levels and results should be interpreted in conjunction with the overall   clinical context.     NOTE: Troponin I testing is performed using a different   testing  methodology at [facility] than at other   [facility]. Direct result comparisons should only   be made within the same method.   CBC WITH AUTO DIFFERENTIAL    WBC 7.6      nRBC 0.0      RBC 4.92      Hemoglobin 15.5      Hematocrit 46.3      MCV 94      MCH 31.5      MCHC 33.5      RDW 14.1      Platelets 251      Neutrophils % 68.0      Immature Granulocytes %, Automated 0.3      Lymphocytes % 20.8      Monocytes % 9.7      Eosinophils % 0.9      Basophils % 0.3      Neutrophils Absolute 5.15      Immature Granulocytes Absolute, Automated 0.02      Lymphocytes Absolute 1.57      Monocytes Absolute 0.73      Eosinophils Absolute 0.07      Basophils Absolute 0.02         CT abdomen pelvis w IV contrast   Final Result   1. No acute process.   2. Coronary artery calcifications.   3. Moderate hepatic steatosis.   4. Cystic structure measuring 1.5 cm in the pancreatic head measuring   0 Hounsfield units. Findings may be secondary to a small pseudocyst or   cystic pancreatic neoplasm. Recommend follow-up contrast-enhanced MRI   in 6 months to reassess.   5. Enlarged prostate.   6. Colonic diverticulosis without findings of diverticulitis.   Signed by Rojas Nunez MD            ED Course & MDM   Diagnoses as of 04/07/24 1018   Generalized abdominal pain   Nausea       Medical Decision Making  Emergency department course, laboratory studies were obtained and reviewed which were unremarkable.  Patient's lactate is 1.2 white blood cell count is normal.  High-sensitivity troponin was 4.  Lipase was 28.  CT abdomen pelvis was obtained which showed no acute process there is some coronary artery calcifications moderate hepatic steatosis and a cystic structure on the pancreatic head that needs monitored.  Patient was made aware of these findings.  I feel comfortable discharging him home to follow-up with his primary care physician.  Patient will be given Zofran for nausea.  Patient is agreeable with this  plan.    Amount and/or Complexity of Data Reviewed  ECG/medicine tests: independent interpretation performed.     Details: EKG shows a normal sinus rhythm at a rate of 81 with nonspecific ST-T wave changes, interpreted by ED physician.        Procedure  Procedures     Hanane Hills DO  04/07/24 1018

## 2024-04-08 LAB
ATRIAL RATE: 81 BPM
P AXIS: 44 DEGREES
PR INTERVAL: 170 MS
Q ONSET: 253 MS
QRS COUNT: 13 BEATS
QRS DURATION: 99 MS
QT INTERVAL: 386 MS
QTC CALCULATION(BAZETT): 448 MS
QTC FREDERICIA: 426 MS
R AXIS: 47 DEGREES
T AXIS: 36 DEGREES
T OFFSET: 446 MS
VENTRICULAR RATE: 81 BPM

## 2024-07-29 DIAGNOSIS — I10 ESSENTIAL (PRIMARY) HYPERTENSION: ICD-10-CM

## 2024-07-29 RX ORDER — METOPROLOL TARTRATE 50 MG/1
50 TABLET ORAL 2 TIMES DAILY
Qty: 180 TABLET | Refills: 3 | Status: SHIPPED | OUTPATIENT
Start: 2024-07-29

## 2024-08-13 DIAGNOSIS — I10 ESSENTIAL (PRIMARY) HYPERTENSION: ICD-10-CM

## 2024-08-13 RX ORDER — TORSEMIDE 20 MG/1
20 TABLET ORAL DAILY
Qty: 90 TABLET | Refills: 3 | Status: SHIPPED | OUTPATIENT
Start: 2024-08-13

## 2024-10-11 ENCOUNTER — APPOINTMENT (OUTPATIENT)
Dept: CARDIOLOGY | Facility: CLINIC | Age: 69
End: 2024-10-11
Payer: MEDICARE

## 2024-11-05 ENCOUNTER — APPOINTMENT (OUTPATIENT)
Dept: CARDIOLOGY | Facility: CLINIC | Age: 69
End: 2024-11-05
Payer: MEDICARE

## 2024-11-26 ENCOUNTER — APPOINTMENT (OUTPATIENT)
Dept: CARDIOLOGY | Facility: CLINIC | Age: 69
End: 2024-11-26
Payer: MEDICARE

## 2024-11-26 VITALS
HEART RATE: 67 BPM | WEIGHT: 262.2 LBS | HEIGHT: 69 IN | SYSTOLIC BLOOD PRESSURE: 124 MMHG | OXYGEN SATURATION: 92 % | BODY MASS INDEX: 38.83 KG/M2 | DIASTOLIC BLOOD PRESSURE: 78 MMHG

## 2024-11-26 DIAGNOSIS — I47.29 NSVT (NONSUSTAINED VENTRICULAR TACHYCARDIA) (MULTI): Primary | ICD-10-CM

## 2024-11-26 DIAGNOSIS — I10 HTN (HYPERTENSION), BENIGN: ICD-10-CM

## 2024-11-26 DIAGNOSIS — E78.49 OTHER HYPERLIPIDEMIA: ICD-10-CM

## 2024-11-26 PROCEDURE — 3008F BODY MASS INDEX DOCD: CPT | Performed by: INTERNAL MEDICINE

## 2024-11-26 PROCEDURE — 99214 OFFICE O/P EST MOD 30 MIN: CPT | Performed by: INTERNAL MEDICINE

## 2024-11-26 PROCEDURE — 1159F MED LIST DOCD IN RCRD: CPT | Performed by: INTERNAL MEDICINE

## 2024-11-26 PROCEDURE — 3074F SYST BP LT 130 MM HG: CPT | Performed by: INTERNAL MEDICINE

## 2024-11-26 PROCEDURE — 3078F DIAST BP <80 MM HG: CPT | Performed by: INTERNAL MEDICINE

## 2024-11-26 RX ORDER — MULTIVIT-MIN/IRON FUM/FOLIC AC 7.5 MG-4
1 TABLET ORAL DAILY
COMMUNITY

## 2024-11-26 RX ORDER — COLCHICINE 0.6 MG/1
0.6 TABLET ORAL AS NEEDED
COMMUNITY
Start: 2024-03-16

## 2024-11-26 NOTE — PROGRESS NOTES
[facility] Cardiology Outpatient Follow-up Visit     Reason for Visit: NSVT     HPI: Jules Madrigal is a 69 y.o.  male who presents today for followup.      The patient is a 69-year-old male with a history of hypertension, hyperlipidemia, morbid obesity, osteoarthritis who initially presented with a syncopal episode in November 2018. He had been taking Adipex and Topamax to lose weight. He woke up feeling nauseous. Despite this he went to work. He felt lightheaded at work and eventually had a syncopal episode. Per report, there were some ventricular tachycardia although we do not have documentation of this. Patient was in trigeminy when he came in to the emergency department. His weight loss medications were stopped and he was placed on beta-blockade. He was discharged with the recorder. His ejection fraction was 50-55%. He states he feels good without chest discomfort, shortness of breath, palpitations, lightheadedness, syncope, orthopnea, PND. He has left greater than right lower extremity edema since a total knee replacement in September 2020.     Echocardiogram demonstrates an ejection fraction of 50-55%. Holter monitoring demonstrates 26,690 PVCs with runs of nonsustained ventricular tachycardia, longest episode being 12 beats in duration. This comprises 11.1% of his total monitored heartbeats. Repeat Holter monitoring demonstrated rare PVCs. MPI August 2020: Normal perfusion, ejection fraction 61%. 12/8/2022 echo: Ejection fraction 55 to 60%, pseudo normal filling.     Past Medical History:   He has a past medical history of Allergy status to unspecified drugs, medicaments and biological substances, Cough, unspecified (12/19/2014), Hemorrhage of anus and rectum, Impacted cerumen, right ear, Other chest pain, Other enthesopathies, not elsewhere classified, Other specified soft tissue disorders, Pain in left knee, Pain in unspecified toe(s), Paresthesia of skin, Personal history of (healed) traumatic  fracture, Personal history of other diseases of the digestive system, Personal history of other diseases of the digestive system, Personal history of other endocrine, nutritional and metabolic disease, Personal history of other medical treatment, Personal history of other medical treatment, Personal history of other specified conditions, Strain of unspecified achilles tendon, initial encounter, Syncope and collapse (12/07/2018), Ventral hernia without obstruction or gangrene, and Ventricular premature depolarization (12/11/2018).    Surgical History:   He has a past surgical history that includes Hernia repair (09/18/2014); Appendectomy (09/18/2014); and Other surgical history (02/18/2021).    Family History:   No family history on file.    Allergies:  Nsaids (non-steroidal anti-inflammatory drug), Penicillins, and Erythromycin     Social History:   Former smoker, social alcohol, no drugs.     Prior Cardiovascular Testing (Personally Reviewed):     Review of Systems:  Review of Systems   All other systems reviewed and are negative.      Outpatient Medications:    Current Outpatient Medications:     albuterol 2.5 mg /3 mL (0.083 %) nebulizer solution, Take 3 mL (2.5 mg) by nebulization every 4 hours if needed., Disp: , Rfl:     albuterol 90 mcg/actuation inhaler, Inhale 2 puffs every 6 hours if needed., Disp: , Rfl:     aspirin 81 mg EC tablet, Take 1 tablet (81 mg) by mouth once daily., Disp: , Rfl:     atorvastatin (Lipitor) 40 mg tablet, Take 1 tablet (40 mg) by mouth once daily., Disp: , Rfl:     cyclobenzaprine (Flexeril) 10 mg tablet, Take 1 tablet (10 mg) by mouth every 8 hours if needed., Disp: , Rfl:     dorzolamide-timoloL (Cosopt) 22.3-6.8 mg/mL ophthalmic solution, Administer 1 drop into affected eye(s) twice a day., Disp: , Rfl:     fluticasone (Flonase) 50 mcg/actuation nasal spray, Administer into affected nostril(s)., Disp: , Rfl:     hydrocortisone acetate 2.5 % cream with perineal applicator, INSERT  1 APPLICATORFUL RECTALLY 3 TIMES DAILY., Disp: , Rfl:     hydrOXYzine pamoate (Vistaril) 50 mg capsule, , Disp: , Rfl:     Klor-Con M20 20 mEq ER tablet, Take 1 tablet (20 mEq) by mouth once daily., Disp: , Rfl:     latanoprost (Xalatan) 0.005 % ophthalmic solution, INSTILL ONE DROP INTO THE RIGHT EYE EVERY EVENING, Disp: , Rfl:     loratadine (Claritin) 10 mg tablet, Take 1 tablet (10 mg) by mouth once daily as needed., Disp: , Rfl:     metoprolol tartrate (Lopressor) 50 mg tablet, TAKE 1 TABLET TWICE A DAY, Disp: 180 tablet, Rfl: 3    pantoprazole (ProtoNix) 40 mg EC tablet, Take by mouth twice a day., Disp: , Rfl:     torsemide (Demadex) 20 mg tablet, TAKE 1 TABLET BY MOUTH EVERY DAY, Disp: 90 tablet, Rfl: 3     Last Recorded Vitals  There were no vitals taken for this visit.    Physical Exam:    Physical Exam  Vitals reviewed.   Constitutional:       Appearance: Normal appearance.   HENT:      Head: Normocephalic and atraumatic.      Mouth/Throat:      Mouth: Mucous membranes are moist.      Pharynx: Oropharynx is clear.   Eyes:      Extraocular Movements: Extraocular movements intact.      Conjunctiva/sclera: Conjunctivae normal.   Cardiovascular:      Rate and Rhythm: Normal rate and regular rhythm.      Pulses: Normal pulses.      Heart sounds: Normal heart sounds.   Pulmonary:      Effort: Pulmonary effort is normal.      Breath sounds: Normal breath sounds.   Abdominal:      General: Bowel sounds are normal.      Palpations: Abdomen is soft.   Musculoskeletal:         General: Swelling present.      Cervical back: Neck supple.   Skin:     General: Skin is warm and dry.   Neurological:      General: No focal deficit present.      Mental Status: He is alert.   Psychiatric:         Mood and Affect: Mood normal.         Behavior: Behavior normal.         Lab/Radiology/Diagnostic Review:    Labs    Lab Results   Component Value Date    GLUCOSE 122 (H) 04/07/2024    CALCIUM 9.3 04/07/2024     04/07/2024     "K 3.8 04/07/2024    CO2 32 04/07/2024     04/07/2024    BUN 16 04/07/2024    CREATININE 0.82 04/07/2024       Lab Results   Component Value Date    WBC 7.6 04/07/2024    HGB 15.5 04/07/2024    HCT 46.3 04/07/2024    MCV 94 04/07/2024     04/07/2024       No results found for: \"CHOL\"  No results found for: \"HDL\"  No results found for: \"LDLCALC\"  No results found for: \"TRIG\"  No components found for: \"CHOLHDL\"    Lab Results   Component Value Date    BNP 14 12/04/2022    BNP 26 11/14/2018       Lab Results   Component Value Date    TSH 2.63 11/14/2018       Assessment:   1.  NSVT.  2.  Hypertension  3.  Hyperlipidemia  4.  Lower extremity edema.     Overall Plan:  Patient is asymptomatic from a cardiac standpoint. He is on aspirin, atorvastatin and beta-blockade.     Patient's Holter monitor demonstrated significant ectopy (11.1% of his total heartbeats). This was off weight loss medications and on low-dose metoprolol. With increasing beta-blockade, a repeat Holter monitor showed rare, isolated PVCs with no ventricular tachycardia. His ejection fraction is normal at 55-60%.      Would continue his current medical therapy. Myocardial perfusion study in August 2020 was negative for ischemia with an ejection fraction 61%. His echocardiogram in December 2022 demonstrated normal LV function     Continue torsemide.     The patient will follow-up with us in 12 months or sooner if he has more problems.    Goldy Wallace MD      "

## 2024-11-26 NOTE — LETTER
November 26, 2024     No Recipients    Patient: Jules Madrigal   YOB: 1955   Date of Visit: 11/26/2024       Dear Dr. Casey Recipients:    Thank you for referring Jules Madrigal to me for evaluation. Below are my notes for this consultation.  If you have questions, please do not hesitate to call me. I look forward to following your patient along with you.       Sincerely,     Goldy Wallace MD      CC: No Recipients  ______________________________________________________________________________________        [facility] Cardiology Outpatient Follow-up Visit     Reason for Visit: NSVT     HPI: Jules Madrigal is a 69 y.o.  male who presents today for followup.      The patient is a 69-year-old male with a history of hypertension, hyperlipidemia, morbid obesity, osteoarthritis who initially presented with a syncopal episode in November 2018. He had been taking Adipex and Topamax to lose weight. He woke up feeling nauseous. Despite this he went to work. He felt lightheaded at work and eventually had a syncopal episode. Per report, there were some ventricular tachycardia although we do not have documentation of this. Patient was in trigeminy when he came in to the emergency department. His weight loss medications were stopped and he was placed on beta-blockade. He was discharged with the recorder. His ejection fraction was 50-55%. He states he feels good without chest discomfort, shortness of breath, palpitations, lightheadedness, syncope, orthopnea, PND. He has left greater than right lower extremity edema since a total knee replacement in September 2020.     Echocardiogram demonstrates an ejection fraction of 50-55%. Holter monitoring demonstrates 26,690 PVCs with runs of nonsustained ventricular tachycardia, longest episode being 12 beats in duration. This comprises 11.1% of his total monitored heartbeats. Repeat Holter monitoring demonstrated rare PVCs. MPI August 2020: Normal  perfusion, ejection fraction 61%. 12/8/2022 echo: Ejection fraction 55 to 60%, pseudo normal filling.     Past Medical History:   He has a past medical history of Allergy status to unspecified drugs, medicaments and biological substances, Cough, unspecified (12/19/2014), Hemorrhage of anus and rectum, Impacted cerumen, right ear, Other chest pain, Other enthesopathies, not elsewhere classified, Other specified soft tissue disorders, Pain in left knee, Pain in unspecified toe(s), Paresthesia of skin, Personal history of (healed) traumatic fracture, Personal history of other diseases of the digestive system, Personal history of other diseases of the digestive system, Personal history of other endocrine, nutritional and metabolic disease, Personal history of other medical treatment, Personal history of other medical treatment, Personal history of other specified conditions, Strain of unspecified achilles tendon, initial encounter, Syncope and collapse (12/07/2018), Ventral hernia without obstruction or gangrene, and Ventricular premature depolarization (12/11/2018).    Surgical History:   He has a past surgical history that includes Hernia repair (09/18/2014); Appendectomy (09/18/2014); and Other surgical history (02/18/2021).    Family History:   No family history on file.    Allergies:  Nsaids (non-steroidal anti-inflammatory drug), Penicillins, and Erythromycin     Social History:   Former smoker, social alcohol, no drugs.     Prior Cardiovascular Testing (Personally Reviewed):     Review of Systems:  Review of Systems   All other systems reviewed and are negative.      Outpatient Medications:    Current Outpatient Medications:   •  albuterol 2.5 mg /3 mL (0.083 %) nebulizer solution, Take 3 mL (2.5 mg) by nebulization every 4 hours if needed., Disp: , Rfl:   •  albuterol 90 mcg/actuation inhaler, Inhale 2 puffs every 6 hours if needed., Disp: , Rfl:   •  aspirin 81 mg EC tablet, Take 1 tablet (81 mg) by mouth once  daily., Disp: , Rfl:   •  atorvastatin (Lipitor) 40 mg tablet, Take 1 tablet (40 mg) by mouth once daily., Disp: , Rfl:   •  cyclobenzaprine (Flexeril) 10 mg tablet, Take 1 tablet (10 mg) by mouth every 8 hours if needed., Disp: , Rfl:   •  dorzolamide-timoloL (Cosopt) 22.3-6.8 mg/mL ophthalmic solution, Administer 1 drop into affected eye(s) twice a day., Disp: , Rfl:   •  fluticasone (Flonase) 50 mcg/actuation nasal spray, Administer into affected nostril(s)., Disp: , Rfl:   •  hydrocortisone acetate 2.5 % cream with perineal applicator, INSERT 1 APPLICATORFUL RECTALLY 3 TIMES DAILY., Disp: , Rfl:   •  hydrOXYzine pamoate (Vistaril) 50 mg capsule, , Disp: , Rfl:   •  Klor-Con M20 20 mEq ER tablet, Take 1 tablet (20 mEq) by mouth once daily., Disp: , Rfl:   •  latanoprost (Xalatan) 0.005 % ophthalmic solution, INSTILL ONE DROP INTO THE RIGHT EYE EVERY EVENING, Disp: , Rfl:   •  loratadine (Claritin) 10 mg tablet, Take 1 tablet (10 mg) by mouth once daily as needed., Disp: , Rfl:   •  metoprolol tartrate (Lopressor) 50 mg tablet, TAKE 1 TABLET TWICE A DAY, Disp: 180 tablet, Rfl: 3  •  pantoprazole (ProtoNix) 40 mg EC tablet, Take by mouth twice a day., Disp: , Rfl:   •  torsemide (Demadex) 20 mg tablet, TAKE 1 TABLET BY MOUTH EVERY DAY, Disp: 90 tablet, Rfl: 3     Last Recorded Vitals  There were no vitals taken for this visit.    Physical Exam:    Physical Exam  Vitals reviewed.   Constitutional:       Appearance: Normal appearance.   HENT:      Head: Normocephalic and atraumatic.      Mouth/Throat:      Mouth: Mucous membranes are moist.      Pharynx: Oropharynx is clear.   Eyes:      Extraocular Movements: Extraocular movements intact.      Conjunctiva/sclera: Conjunctivae normal.   Cardiovascular:      Rate and Rhythm: Normal rate and regular rhythm.      Pulses: Normal pulses.      Heart sounds: Normal heart sounds.   Pulmonary:      Effort: Pulmonary effort is normal.      Breath sounds: Normal breath sounds.  "  Abdominal:      General: Bowel sounds are normal.      Palpations: Abdomen is soft.   Musculoskeletal:         General: Swelling present.      Cervical back: Neck supple.   Skin:     General: Skin is warm and dry.   Neurological:      General: No focal deficit present.      Mental Status: He is alert.   Psychiatric:         Mood and Affect: Mood normal.         Behavior: Behavior normal.         Lab/Radiology/Diagnostic Review:    Labs    Lab Results   Component Value Date    GLUCOSE 122 (H) 04/07/2024    CALCIUM 9.3 04/07/2024     04/07/2024    K 3.8 04/07/2024    CO2 32 04/07/2024     04/07/2024    BUN 16 04/07/2024    CREATININE 0.82 04/07/2024       Lab Results   Component Value Date    WBC 7.6 04/07/2024    HGB 15.5 04/07/2024    HCT 46.3 04/07/2024    MCV 94 04/07/2024     04/07/2024       No results found for: \"CHOL\"  No results found for: \"HDL\"  No results found for: \"LDLCALC\"  No results found for: \"TRIG\"  No components found for: \"CHOLHDL\"    Lab Results   Component Value Date    BNP 14 12/04/2022    BNP 26 11/14/2018       Lab Results   Component Value Date    TSH 2.63 11/14/2018       Assessment:   1.  NSVT.  2.  Hypertension  3.  Hyperlipidemia  4.  Lower extremity edema.     Overall Plan:  Patient is asymptomatic from a cardiac standpoint. He is on aspirin, atorvastatin and beta-blockade.     Patient's Holter monitor demonstrated significant ectopy (11.1% of his total heartbeats). This was off weight loss medications and on low-dose metoprolol. With increasing beta-blockade, a repeat Holter monitor showed rare, isolated PVCs with no ventricular tachycardia. His ejection fraction is normal at 55-60%.      Would continue his current medical therapy. Myocardial perfusion study in August 2020 was negative for ischemia with an ejection fraction 61%. His echocardiogram in December 2022 demonstrated normal LV function     Continue torsemide.     The patient will follow-up with us in 12 " months or sooner if he has more problems.    Goldy Wallace MD

## 2024-11-26 NOTE — LETTER
November 26, 2024     Isidro Stevenson MD  [address] [facility] [address]  [facility] [address]    Patient: Jules Madrigal   YOB: 1955   Date of Visit: 11/26/2024       Dear Dr. Isidro Stevenson MD:    Thank you for referring Jules Madrigal to me for evaluation. Below are my notes for this consultation.  If you have questions, please do not hesitate to call me. I look forward to following your patient along with you.       Sincerely,     Goldy Wallace MD      CC: No Recipients  ______________________________________________________________________________________        [facility]iology Outpatient Follow-up Visit     Reason for Visit: NSVT     HPI: Jules Madrigal is a 69 y.o.  male who presents today for followup.      The patient is a 69-year-old male with a history of hypertension, hyperlipidemia, morbid obesity, osteoarthritis who initially presented with a syncopal episode in November 2018. He had been taking Adipex and Topamax to lose weight. He woke up feeling nauseous. Despite this he went to work. He felt lightheaded at work and eventually had a syncopal episode. Per report, there were some ventricular tachycardia although we do not have documentation of this. Patient was in trigeminy when he came in to the emergency department. His weight loss medications were stopped and he was placed on beta-blockade. He was discharged with the recorder. His ejection fraction was 50-55%. He states he feels good without chest discomfort, shortness of breath, palpitations, lightheadedness, syncope, orthopnea, PND. He has left greater than right lower extremity edema since a total knee replacement in September 2020.     Echocardiogram demonstrates an ejection fraction of 50-55%. Holter monitoring demonstrates 26,690 PVCs with runs of nonsustained ventricular tachycardia, longest episode being 12 beats in duration. This comprises 11.1% of his total monitored heartbeats. Repeat Holter  monitoring demonstrated rare PVCs. MPI August 2020: Normal perfusion, ejection fraction 61%. 12/8/2022 echo: Ejection fraction 55 to 60%, pseudo normal filling.     Past Medical History:   He has a past medical history of Allergy status to unspecified drugs, medicaments and biological substances, Cough, unspecified (12/19/2014), Hemorrhage of anus and rectum, Impacted cerumen, right ear, Other chest pain, Other enthesopathies, not elsewhere classified, Other specified soft tissue disorders, Pain in left knee, Pain in unspecified toe(s), Paresthesia of skin, Personal history of (healed) traumatic fracture, Personal history of other diseases of the digestive system, Personal history of other diseases of the digestive system, Personal history of other endocrine, nutritional and metabolic disease, Personal history of other medical treatment, Personal history of other medical treatment, Personal history of other specified conditions, Strain of unspecified achilles tendon, initial encounter, Syncope and collapse (12/07/2018), Ventral hernia without obstruction or gangrene, and Ventricular premature depolarization (12/11/2018).    Surgical History:   He has a past surgical history that includes Hernia repair (09/18/2014); Appendectomy (09/18/2014); and Other surgical history (02/18/2021).    Family History:   No family history on file.    Allergies:  Nsaids (non-steroidal anti-inflammatory drug), Penicillins, and Erythromycin     Social History:   Former smoker, social alcohol, no drugs.     Prior Cardiovascular Testing (Personally Reviewed):     Review of Systems:  Review of Systems   All other systems reviewed and are negative.      Outpatient Medications:    Current Outpatient Medications:   •  albuterol 2.5 mg /3 mL (0.083 %) nebulizer solution, Take 3 mL (2.5 mg) by nebulization every 4 hours if needed., Disp: , Rfl:   •  albuterol 90 mcg/actuation inhaler, Inhale 2 puffs every 6 hours if needed., Disp: , Rfl:   •   aspirin 81 mg EC tablet, Take 1 tablet (81 mg) by mouth once daily., Disp: , Rfl:   •  atorvastatin (Lipitor) 40 mg tablet, Take 1 tablet (40 mg) by mouth once daily., Disp: , Rfl:   •  cyclobenzaprine (Flexeril) 10 mg tablet, Take 1 tablet (10 mg) by mouth every 8 hours if needed., Disp: , Rfl:   •  dorzolamide-timoloL (Cosopt) 22.3-6.8 mg/mL ophthalmic solution, Administer 1 drop into affected eye(s) twice a day., Disp: , Rfl:   •  fluticasone (Flonase) 50 mcg/actuation nasal spray, Administer into affected nostril(s)., Disp: , Rfl:   •  hydrocortisone acetate 2.5 % cream with perineal applicator, INSERT 1 APPLICATORFUL RECTALLY 3 TIMES DAILY., Disp: , Rfl:   •  hydrOXYzine pamoate (Vistaril) 50 mg capsule, , Disp: , Rfl:   •  Klor-Con M20 20 mEq ER tablet, Take 1 tablet (20 mEq) by mouth once daily., Disp: , Rfl:   •  latanoprost (Xalatan) 0.005 % ophthalmic solution, INSTILL ONE DROP INTO THE RIGHT EYE EVERY EVENING, Disp: , Rfl:   •  loratadine (Claritin) 10 mg tablet, Take 1 tablet (10 mg) by mouth once daily as needed., Disp: , Rfl:   •  metoprolol tartrate (Lopressor) 50 mg tablet, TAKE 1 TABLET TWICE A DAY, Disp: 180 tablet, Rfl: 3  •  pantoprazole (ProtoNix) 40 mg EC tablet, Take by mouth twice a day., Disp: , Rfl:   •  torsemide (Demadex) 20 mg tablet, TAKE 1 TABLET BY MOUTH EVERY DAY, Disp: 90 tablet, Rfl: 3     Last Recorded Vitals  There were no vitals taken for this visit.    Physical Exam:    Physical Exam  Vitals reviewed.   Constitutional:       Appearance: Normal appearance.   HENT:      Head: Normocephalic and atraumatic.      Mouth/Throat:      Mouth: Mucous membranes are moist.      Pharynx: Oropharynx is clear.   Eyes:      Extraocular Movements: Extraocular movements intact.      Conjunctiva/sclera: Conjunctivae normal.   Cardiovascular:      Rate and Rhythm: Normal rate and regular rhythm.      Pulses: Normal pulses.      Heart sounds: Normal heart sounds.   Pulmonary:      Effort: Pulmonary  "effort is normal.      Breath sounds: Normal breath sounds.   Abdominal:      General: Bowel sounds are normal.      Palpations: Abdomen is soft.   Musculoskeletal:         General: Swelling present.      Cervical back: Neck supple.   Skin:     General: Skin is warm and dry.   Neurological:      General: No focal deficit present.      Mental Status: He is alert.   Psychiatric:         Mood and Affect: Mood normal.         Behavior: Behavior normal.         Lab/Radiology/Diagnostic Review:    Labs    Lab Results   Component Value Date    GLUCOSE 122 (H) 04/07/2024    CALCIUM 9.3 04/07/2024     04/07/2024    K 3.8 04/07/2024    CO2 32 04/07/2024     04/07/2024    BUN 16 04/07/2024    CREATININE 0.82 04/07/2024       Lab Results   Component Value Date    WBC 7.6 04/07/2024    HGB 15.5 04/07/2024    HCT 46.3 04/07/2024    MCV 94 04/07/2024     04/07/2024       No results found for: \"CHOL\"  No results found for: \"HDL\"  No results found for: \"LDLCALC\"  No results found for: \"TRIG\"  No components found for: \"CHOLHDL\"    Lab Results   Component Value Date    BNP 14 12/04/2022    BNP 26 11/14/2018       Lab Results   Component Value Date    TSH 2.63 11/14/2018       Assessment:   1.  NSVT.  2.  Hypertension  3.  Hyperlipidemia  4.  Lower extremity edema.     Overall Plan:  Patient is asymptomatic from a cardiac standpoint. He is on aspirin, atorvastatin and beta-blockade.     Patient's Holter monitor demonstrated significant ectopy (11.1% of his total heartbeats). This was off weight loss medications and on low-dose metoprolol. With increasing beta-blockade, a repeat Holter monitor showed rare, isolated PVCs with no ventricular tachycardia. His ejection fraction is normal at 55-60%.      Would continue his current medical therapy. Myocardial perfusion study in August 2020 was negative for ischemia with an ejection fraction 61%. His echocardiogram in December 2022 demonstrated normal LV function   "   Continue torsemide.     The patient will follow-up with us in 12 months or sooner if he has more problems.    Goldy Wallace MD

## 2025-04-15 ENCOUNTER — APPOINTMENT (OUTPATIENT)
Dept: RADIOLOGY | Facility: HOSPITAL | Age: 70
End: 2025-04-15
Payer: MEDICARE

## 2025-04-15 ENCOUNTER — HOSPITAL ENCOUNTER (EMERGENCY)
Facility: HOSPITAL | Age: 70
Discharge: HOME | End: 2025-04-15
Attending: EMERGENCY MEDICINE
Payer: MEDICARE

## 2025-04-15 VITALS
OXYGEN SATURATION: 96 % | RESPIRATION RATE: 16 BRPM | TEMPERATURE: 97.9 F | WEIGHT: 265 LBS | DIASTOLIC BLOOD PRESSURE: 61 MMHG | SYSTOLIC BLOOD PRESSURE: 127 MMHG | HEIGHT: 68 IN | BODY MASS INDEX: 40.16 KG/M2 | HEART RATE: 86 BPM

## 2025-04-15 DIAGNOSIS — R10.32 LEFT LOWER QUADRANT ABDOMINAL PAIN: Primary | ICD-10-CM

## 2025-04-15 LAB
ALBUMIN SERPL BCP-MCNC: 4 G/DL (ref 3.4–5)
ALP SERPL-CCNC: 37 U/L (ref 33–136)
ALT SERPL W P-5'-P-CCNC: 51 U/L (ref 10–52)
ANION GAP SERPL CALC-SCNC: 13 MMOL/L (ref 10–20)
APPEARANCE UR: CLEAR
AST SERPL W P-5'-P-CCNC: 30 U/L (ref 9–39)
BASOPHILS # BLD AUTO: 0.03 X10*3/UL (ref 0–0.1)
BASOPHILS NFR BLD AUTO: 0.3 %
BILIRUB SERPL-MCNC: 0.8 MG/DL (ref 0–1.2)
BILIRUB UR STRIP.AUTO-MCNC: NEGATIVE MG/DL
BUN SERPL-MCNC: 18 MG/DL (ref 6–23)
CALCIUM SERPL-MCNC: 9.2 MG/DL (ref 8.6–10.3)
CHLORIDE SERPL-SCNC: 102 MMOL/L (ref 98–107)
CO2 SERPL-SCNC: 28 MMOL/L (ref 21–32)
COLOR UR: YELLOW
CREAT SERPL-MCNC: 0.88 MG/DL (ref 0.5–1.3)
EGFRCR SERPLBLD CKD-EPI 2021: >90 ML/MIN/1.73M*2
EOSINOPHIL # BLD AUTO: 0.07 X10*3/UL (ref 0–0.7)
EOSINOPHIL NFR BLD AUTO: 0.7 %
ERYTHROCYTE [DISTWIDTH] IN BLOOD BY AUTOMATED COUNT: 15 % (ref 11.5–14.5)
GLUCOSE SERPL-MCNC: 135 MG/DL (ref 74–99)
GLUCOSE UR STRIP.AUTO-MCNC: NORMAL MG/DL
HCT VFR BLD AUTO: 48.9 % (ref 41–52)
HGB BLD-MCNC: 15.6 G/DL (ref 13.5–17.5)
HOLD SPECIMEN: NORMAL
IMM GRANULOCYTES # BLD AUTO: 0.05 X10*3/UL (ref 0–0.7)
IMM GRANULOCYTES NFR BLD AUTO: 0.5 % (ref 0–0.9)
KETONES UR STRIP.AUTO-MCNC: NEGATIVE MG/DL
LACTATE SERPL-SCNC: 1.7 MMOL/L (ref 0.4–2)
LEUKOCYTE ESTERASE UR QL STRIP.AUTO: NEGATIVE
LYMPHOCYTES # BLD AUTO: 2.1 X10*3/UL (ref 1.2–4.8)
LYMPHOCYTES NFR BLD AUTO: 19.9 %
MCH RBC QN AUTO: 30.2 PG (ref 26–34)
MCHC RBC AUTO-ENTMCNC: 31.9 G/DL (ref 32–36)
MCV RBC AUTO: 95 FL (ref 80–100)
MONOCYTES # BLD AUTO: 1.03 X10*3/UL (ref 0.1–1)
MONOCYTES NFR BLD AUTO: 9.7 %
NEUTROPHILS # BLD AUTO: 7.29 X10*3/UL (ref 1.2–7.7)
NEUTROPHILS NFR BLD AUTO: 68.9 %
NITRITE UR QL STRIP.AUTO: NEGATIVE
NRBC BLD-RTO: 0 /100 WBCS (ref 0–0)
PH UR STRIP.AUTO: 5 [PH]
PLATELET # BLD AUTO: 237 X10*3/UL (ref 150–450)
POTASSIUM SERPL-SCNC: 3.7 MMOL/L (ref 3.5–5.3)
PROT SERPL-MCNC: 7.2 G/DL (ref 6.4–8.2)
PROT UR STRIP.AUTO-MCNC: NEGATIVE MG/DL
RBC # BLD AUTO: 5.17 X10*6/UL (ref 4.5–5.9)
RBC # UR STRIP.AUTO: NEGATIVE MG/DL
SODIUM SERPL-SCNC: 139 MMOL/L (ref 136–145)
SP GR UR STRIP.AUTO: >1.05
UROBILINOGEN UR STRIP.AUTO-MCNC: NORMAL MG/DL
WBC # BLD AUTO: 10.6 X10*3/UL (ref 4.4–11.3)

## 2025-04-15 PROCEDURE — 83605 ASSAY OF LACTIC ACID: CPT | Performed by: EMERGENCY MEDICINE

## 2025-04-15 PROCEDURE — 96361 HYDRATE IV INFUSION ADD-ON: CPT

## 2025-04-15 PROCEDURE — 99285 EMERGENCY DEPT VISIT HI MDM: CPT | Mod: 25 | Performed by: EMERGENCY MEDICINE

## 2025-04-15 PROCEDURE — 74177 CT ABD & PELVIS W/CONTRAST: CPT

## 2025-04-15 PROCEDURE — 85025 COMPLETE CBC W/AUTO DIFF WBC: CPT | Performed by: EMERGENCY MEDICINE

## 2025-04-15 PROCEDURE — 2500000004 HC RX 250 GENERAL PHARMACY W/ HCPCS (ALT 636 FOR OP/ED): Performed by: EMERGENCY MEDICINE

## 2025-04-15 PROCEDURE — 96375 TX/PRO/DX INJ NEW DRUG ADDON: CPT

## 2025-04-15 PROCEDURE — 96374 THER/PROPH/DIAG INJ IV PUSH: CPT

## 2025-04-15 PROCEDURE — 80053 COMPREHEN METABOLIC PANEL: CPT | Performed by: EMERGENCY MEDICINE

## 2025-04-15 PROCEDURE — 81003 URINALYSIS AUTO W/O SCOPE: CPT | Performed by: EMERGENCY MEDICINE

## 2025-04-15 PROCEDURE — 2550000001 HC RX 255 CONTRASTS: Performed by: EMERGENCY MEDICINE

## 2025-04-15 PROCEDURE — 36415 COLL VENOUS BLD VENIPUNCTURE: CPT | Performed by: EMERGENCY MEDICINE

## 2025-04-15 PROCEDURE — 74177 CT ABD & PELVIS W/CONTRAST: CPT | Mod: FOREIGN READ | Performed by: RADIOLOGY

## 2025-04-15 RX ORDER — OXYCODONE HYDROCHLORIDE 5 MG/1
5 TABLET ORAL EVERY 6 HOURS PRN
Qty: 15 TABLET | Refills: 0 | Status: SHIPPED | OUTPATIENT
Start: 2025-04-15 | End: 2025-04-18

## 2025-04-15 RX ORDER — ONDANSETRON HYDROCHLORIDE 2 MG/ML
4 INJECTION, SOLUTION INTRAVENOUS ONCE
Status: COMPLETED | OUTPATIENT
Start: 2025-04-15 | End: 2025-04-15

## 2025-04-15 RX ADMIN — IOHEXOL 75 ML: 350 INJECTION, SOLUTION INTRAVENOUS at 11:15

## 2025-04-15 RX ADMIN — HYDROMORPHONE HYDROCHLORIDE 0.4 MG: 1 INJECTION, SOLUTION INTRAMUSCULAR; INTRAVENOUS; SUBCUTANEOUS at 08:17

## 2025-04-15 RX ADMIN — SODIUM CHLORIDE 1000 ML: 0.9 INJECTION, SOLUTION INTRAVENOUS at 11:57

## 2025-04-15 RX ADMIN — ONDANSETRON 4 MG: 2 INJECTION INTRAMUSCULAR; INTRAVENOUS at 09:58

## 2025-04-15 ASSESSMENT — PAIN DESCRIPTION - ORIENTATION: ORIENTATION: LEFT

## 2025-04-15 ASSESSMENT — PAIN DESCRIPTION - PAIN TYPE: TYPE: ACUTE PAIN

## 2025-04-15 ASSESSMENT — PAIN DESCRIPTION - LOCATION
LOCATION: ABDOMEN
LOCATION: ABDOMEN

## 2025-04-15 ASSESSMENT — PAIN SCALES - GENERAL
PAINLEVEL_OUTOF10: 8
PAINLEVEL_OUTOF10: 9

## 2025-04-15 ASSESSMENT — PAIN - FUNCTIONAL ASSESSMENT: PAIN_FUNCTIONAL_ASSESSMENT: 0-10

## 2025-04-15 NOTE — ED PROVIDER NOTES
HPI   Chief Complaint   Patient presents with    Abdominal Pain     Left abdominal and flank pain x3 weeks. Seen PMD no kidney stones, CT on Friday showed diverticulitis given abd and pain medication. Patient states no relief of pain and now has nausea.       HPI  Jeremias Madrigal is a 69 years old male who presents with 3 weeks history of abdominal pain.  He has pain in his left flank radiating into the left lower quadrant for the past 3 weeks.  He describes the pain as sharp that gets worse when he stands up.  No bowel or bladder incontinence or radicular pain in the leg.  No fever, chills, diarrhea, urinary symptoms or hematuria.  No pain in the midline lumbar spine.  Patient has seen a urologist and was diagnosed to have no significant urological issues.  He had a CT of the abdomen and pelvis days ago that revealed diverticulitis.  Patient is currently on antibiotics. for 4 days now and states that his pain is not any better.  It is sharp in nature and jabbing but worse when he stands up.  No fever, chills, urinary symptoms.  All other review of system was negative for cardiac or pulmonary disease.  Past medical history significant for ventricular trigeminy, tacky arrhythmia, osteoarthritis and nonsustained V. tach, irritable bowel disease, hyperlipidemia, hypertension, GERD, asthma.  No cardiac history reported and he is not a known diabetic.  Patient History   Past Medical History:   Diagnosis Date    Allergy status to unspecified drugs, medicaments and biological substances     History of seasonal allergies    Cough, unspecified 12/19/2014    Cough    Hemorrhage of anus and rectum     Rectal bleed    Impacted cerumen, right ear     Impacted cerumen of right ear    Other chest pain     Atypical chest pain    Other enthesopathies, not elsewhere classified     Tendinitis of thumb    Other specified soft tissue disorders     Leg swelling    Pain in left knee     Left knee pain    Pain in unspecified toe(s)     Toe  pain    Paresthesia of skin     Paresthesia    Personal history of (healed) traumatic fracture     History of fractured kneecap    Personal history of other diseases of the digestive system     History of constipation    Personal history of other diseases of the digestive system     History of hemorrhoids    Personal history of other endocrine, nutritional and metabolic disease     History of obesity    Personal history of other medical treatment     History of nuclear stress test    Personal history of other medical treatment     History of echocardiogram    Personal history of other specified conditions     History of nausea    Strain of unspecified achilles tendon, initial encounter     Torn Achilles tendon    Syncope and collapse 12/07/2018    Syncope, near    Ventral hernia without obstruction or gangrene     Epigastric hernia    Ventricular premature depolarization 12/11/2018    Symptomatic PVCs     Past Surgical History:   Procedure Laterality Date    APPENDECTOMY  09/18/2014    Appendectomy    HERNIA REPAIR  09/18/2014    Hernia Repair    OTHER SURGICAL HISTORY  02/18/2021    Knee surgery     No family history on file.  Social History     Tobacco Use    Smoking status: Never    Smokeless tobacco: Never   Substance Use Topics    Alcohol use: Yes     Comment: occasional    Drug use: Never       Physical Exam   ED Triage Vitals [04/15/25 0709]   Temperature Heart Rate Respirations BP   36.6 °C (97.9 °F) 96 18 (!) 166/93      Pulse Ox Temp Source Heart Rate Source Patient Position   95 % Temporal Monitor Sitting      BP Location FiO2 (%)     Right arm --       Physical Exam  Vitals and nursing note reviewed.   Constitutional:       General: He is not in acute distress.     Appearance: Normal appearance. He is not ill-appearing.      Comments: This is a pleasant male sitting on the gurney who has an elevated BMI.  He has a protuberant large abdomen.  He converses well and is a good historian   HENT:      Head:  Normocephalic.      Mouth/Throat:      Mouth: Mucous membranes are moist.   Eyes:      Conjunctiva/sclera: Conjunctivae normal.   Cardiovascular:      Rate and Rhythm: Normal rate and regular rhythm.   Pulmonary:      Effort: Pulmonary effort is normal.      Breath sounds: Normal breath sounds.   Abdominal:      General: Abdomen is protuberant. There is no distension.      Palpations: Abdomen is soft.      Tenderness: There is abdominal tenderness in the left lower quadrant. There is no guarding.          Comments: Mild tenderness in the left lower quadrant on deep palpation.  No pulsations or pulsatile mass noted.  No hernia noted.  Abdomen is obese and tight and tenderness is elicited on deep palpation with no peritoneal signs.   Musculoskeletal:         General: Normal range of motion.      Cervical back: Normal range of motion and neck supple.   Skin:     General: Skin is warm.      Capillary Refill: Capillary refill takes less than 2 seconds.   Neurological:      General: No focal deficit present.      Mental Status: He is alert and oriented to person, place, and time.   Psychiatric:         Mood and Affect: Mood normal.           ED Course & MDM   ED Course as of 04/15/25 1420   Tue Apr 15, 2025   1044 Count is 10,000.  I cannot access his CT from recent and he will care [RK]   1045 Care everywhere was reviewed and patient was noted to have a CT of the abdomen pelvis [RK]   1048 White count is not elevated.  No CT scan was found in the system.  Patient will be evaluated with another CT scan [RK]   1240 Scattered diverticula in the large bowel represent diverticulosis.   The loops of small and large bowel demonstrate no abnormal dilatation  or focal wall thickening.  Moderate colonic fecal material suggestive  of constipation.     VESSELS:  No abnormalities identified.  Abdominal aorta is normal in caliber.      PERITONEUM/RETROPERITONEUM/LYMPH NODES:  No free fluid.  No pneumoperitoneum.  No lymphadenopathy.      ABDOMINAL WALL:  No abnormalities identified.  SOFT TISSUES:   No abnormalities identified.     BONES:  No acute fracture or aggressive osseous lesion.  IMPRESSION:  1.  No evidence for acute infectious or inflammatory process.  2.  Colonic diverticulosis with no evidence for acute diverticulitis.  3.  Hepatomegaly with hepatic steatosis.  4.  Moderate colonic fecal material suggestive of constipation.  Signed by Donald Gomez MD   [RK]   1240 Glucose is 135 with no anion gap and normal electrolytes and GFR.  Urine reveals no significant [RK]   1240  finding.  White count is not elevated.  Patient CT reveals loops of small and large bowel with no dilatation or focal thickening.  No evidence of diverticulitis.  He was reassured and advised to follow-up with his physician as no significant finding was noted.  He was advised to take Tylenol for pain and avoid taking Motrin too much but he could alternate it with Motrin if he needs it no significant pathology was noted on his CT.  He has no evidence of diverticulitis or diverticular abscess [RK]   1339 The patient's PCP.  There is concerned about hip impingement syndrome.  I advised him that there was no significant finding on bony part of hip or pelvis but he can be referred to orthopedic.  Patient is comfortable with that decision.  He was given oxycodone limited tablets after PDMP was reviewed and he was discharged home and appeared more satisfied [RK]      ED Course User Index  [RK] Natalee Finn MD         Diagnoses as of 04/15/25 1420   Left lower quadrant abdominal pain                 No data recorded     [place] Coma Scale Score: 15 (04/15/25 0732 : Luke Mendoza, RN)                           Medical Decision Making    3 weeks history of abdominal pain  Pain in the left flank to the left lower quadrant  Diagnosed with diverticulitis  No urological related problems or kidney stone.   CT from several days ago reveals diverticulitis and he has been on  antibiotics for 4 days with no improvement of pain.  Patient was ordered repeat labs and given 0.4 mg of Dilaudid.  Differential diagnosis considered was diverticular abscess, UTI, sepsis.  Kidney stones have been ruled out.  Patient will be reevaluated after his labs are  Procedure  Procedures            Natalee Finn MD  04/15/25 7524

## 2025-04-15 NOTE — DISCHARGE INSTRUCTIONS
There is no evidence of diverticulitis on your CT  Your labs are unimpressive  It is important that you follow-up with your primary care physician  Take Tylenol or Motrin as needed  You may consider seeing your GI doctor again.  There is no evidence of kidney stone.  Do not drink or drive with oxycodone medications follow-up with your primary care physician and see an orthopedic doctor for further

## 2025-04-15 NOTE — ED TRIAGE NOTES
Left abdominal and flank pain x3 weeks. Seen PMD no kidney stones, CT on Friday showed diverticulitis given abd and pain medication. Patient states no relief of pain and now has nausea.